# Patient Record
Sex: FEMALE | Race: WHITE | NOT HISPANIC OR LATINO | Employment: PART TIME | URBAN - METROPOLITAN AREA
[De-identification: names, ages, dates, MRNs, and addresses within clinical notes are randomized per-mention and may not be internally consistent; named-entity substitution may affect disease eponyms.]

---

## 2024-07-08 ENCOUNTER — HOSPITAL ENCOUNTER (EMERGENCY)
Facility: HOSPITAL | Age: 29
Discharge: HOME | End: 2024-07-08
Attending: STUDENT IN AN ORGANIZED HEALTH CARE EDUCATION/TRAINING PROGRAM
Payer: MEDICAID

## 2024-07-08 ENCOUNTER — APPOINTMENT (OUTPATIENT)
Dept: CARDIOLOGY | Facility: HOSPITAL | Age: 29
End: 2024-07-08
Payer: MEDICAID

## 2024-07-08 ENCOUNTER — APPOINTMENT (OUTPATIENT)
Dept: RADIOLOGY | Facility: HOSPITAL | Age: 29
End: 2024-07-08
Payer: MEDICAID

## 2024-07-08 VITALS
HEIGHT: 67 IN | DIASTOLIC BLOOD PRESSURE: 78 MMHG | HEART RATE: 70 BPM | OXYGEN SATURATION: 100 % | WEIGHT: 208 LBS | TEMPERATURE: 97.2 F | BODY MASS INDEX: 32.65 KG/M2 | RESPIRATION RATE: 18 BRPM | SYSTOLIC BLOOD PRESSURE: 126 MMHG

## 2024-07-08 DIAGNOSIS — F41.9 ANXIETY: ICD-10-CM

## 2024-07-08 DIAGNOSIS — K21.9 GASTROESOPHAGEAL REFLUX DISEASE, UNSPECIFIED WHETHER ESOPHAGITIS PRESENT: Primary | ICD-10-CM

## 2024-07-08 PROCEDURE — 71046 X-RAY EXAM CHEST 2 VIEWS: CPT | Mod: FOREIGN READ | Performed by: RADIOLOGY

## 2024-07-08 PROCEDURE — 71046 X-RAY EXAM CHEST 2 VIEWS: CPT

## 2024-07-08 PROCEDURE — 99283 EMERGENCY DEPT VISIT LOW MDM: CPT | Mod: 25 | Performed by: STUDENT IN AN ORGANIZED HEALTH CARE EDUCATION/TRAINING PROGRAM

## 2024-07-08 PROCEDURE — 93005 ELECTROCARDIOGRAM TRACING: CPT

## 2024-07-08 PROCEDURE — 2500000001 HC RX 250 WO HCPCS SELF ADMINISTERED DRUGS (ALT 637 FOR MEDICARE OP): Performed by: STUDENT IN AN ORGANIZED HEALTH CARE EDUCATION/TRAINING PROGRAM

## 2024-07-08 RX ORDER — HYDROXYZINE HYDROCHLORIDE 25 MG/1
25 TABLET, FILM COATED ORAL EVERY 6 HOURS
Qty: 12 TABLET | Refills: 0 | Status: SHIPPED | OUTPATIENT
Start: 2024-07-08 | End: 2024-07-11

## 2024-07-08 RX ORDER — FAMOTIDINE 20 MG/1
20 TABLET, FILM COATED ORAL ONCE
Status: COMPLETED | OUTPATIENT
Start: 2024-07-08 | End: 2024-07-08

## 2024-07-08 RX ORDER — FAMOTIDINE 20 MG/1
20 TABLET, FILM COATED ORAL DAILY
Qty: 30 TABLET | Refills: 0 | Status: SHIPPED | OUTPATIENT
Start: 2024-07-08 | End: 2024-08-07

## 2024-07-08 ASSESSMENT — PAIN - FUNCTIONAL ASSESSMENT: PAIN_FUNCTIONAL_ASSESSMENT: 0-10

## 2024-07-08 ASSESSMENT — LIFESTYLE VARIABLES
TOTAL SCORE: 0
HAVE PEOPLE ANNOYED YOU BY CRITICIZING YOUR DRINKING: NO
EVER HAD A DRINK FIRST THING IN THE MORNING TO STEADY YOUR NERVES TO GET RID OF A HANGOVER: NO
HAVE YOU EVER FELT YOU SHOULD CUT DOWN ON YOUR DRINKING: NO
EVER FELT BAD OR GUILTY ABOUT YOUR DRINKING: NO

## 2024-07-08 ASSESSMENT — PAIN SCALES - GENERAL: PAINLEVEL_OUTOF10: 0 - NO PAIN

## 2024-07-08 ASSESSMENT — COLUMBIA-SUICIDE SEVERITY RATING SCALE - C-SSRS
1. IN THE PAST MONTH, HAVE YOU WISHED YOU WERE DEAD OR WISHED YOU COULD GO TO SLEEP AND NOT WAKE UP?: NO
6. HAVE YOU EVER DONE ANYTHING, STARTED TO DO ANYTHING, OR PREPARED TO DO ANYTHING TO END YOUR LIFE?: NO
2. HAVE YOU ACTUALLY HAD ANY THOUGHTS OF KILLING YOURSELF?: NO

## 2024-07-08 NOTE — ED PROVIDER NOTES
"EMERGENCY MEDICINE EVALUATION NOTE    History of Present Illness     Chief Complaint:   Chief Complaint   Patient presents with    Dysphagia     Pt presents to er for complaints of lump sensation in throat that causes patient to have the sensation of difficulty breathing. Pt states able to swallow and breath okay. Pt states tingling sensation going up both sides of her head.       HPI: Tamika Stuart is a 28 y.o. female who presents with complaint of shortness of breath and trouble swallowing.  Patient states for the past several weeks she has been having intermittent episodes of the \"lump sensation\" in her throat I discussed the breathing difficulty.  She states this can occur sporadically while she is at work sitting down without any provoking factors.  She states she does feel anxious at that time although has been having also an occasional burning sensation in her throat.  She has been history of acid reflux in the past with somewhat similar symptoms.  She also admits occasional tingling sensation on both sides of her head.  Denies any current symptoms.  Denies any fever, chills, abdominal pain, nausea or vomiting.  Denies current pregnancy.  She has also had some occasional left-sided chest wall pain.    Previous History     Past Medical History:   Diagnosis Date    Other specified health status     Patient denies medical problems     No past surgical history on file.     No family history on file.  Not on File  Current Outpatient Medications   Medication Instructions    famotidine (PEPCID) 20 mg, oral, Daily    hydrOXYzine HCL (ATARAX) 25 mg, oral, Every 6 hours       Physical Exam     Appearance: Alert, oriented , cooperative,  in acute distress. Well nourished & well hydrated.     Skin: Intact,  dry skin, no lesions, rash, petechiae or purpura.      Eyes: PERRLA, EOMs intact,  Conjunctiva pink with no redness or exudates. Cornea & anterior chamber are clear, Eyelids without lesions. No scleral icterus.    " "  ENT: Hearing grossly intact. External auditory canals patent, tympanic membranes intact with visible landmarks. Nares patent, mucus membranes moist. Dentition without lesions. Pharynx clear, uvula midline.      Neck: Supple, without meningismus. Thyroid not palpable. Trachea at midline. No lymphadenopathy.     Pulmonary: Clear bilaterally with good chest wall excursion. No rales, rhonchi or wheezing. No accessory muscle use or stridor.     Cardiac: Normal S1, S2 without murmur, rub, gallop or extrasystole. No JVD, Carotids without bruits.     Abdomen: Soft, nontender, active bowel sounds.  No palpable organomegaly.  No rebound or guarding.  No CVA tenderness.     Genitourinary: Exam deferred.     Musculoskeletal: Full range of motion. no pain, edema, or deformity. Pulses full and equal. No cyanosis or clubbing.      Neurological:  Cranial nerves II through XII are grossly intact, finger-nose touch is normal, normal sensation, no weakness, no focal findings identified.     Psychiatric: Anxious mood and elevated affect.     Results   Labs Reviewed - No data to display  XR chest 2 views   Final Result   No acute infiltrate or effusion.   Signed by Terrance Seals MD            ED Course & Medical Decision Making     Medications   famotidine (Pepcid) tablet 20 mg (20 mg oral Given 7/8/24 0849)     Diagnoses as of 07/08/24 0927   Gastroesophageal reflux disease, unspecified whether esophagitis present   Anxiety     Heart Rate:  [70]   Temperature:  [36.2 °C (97.2 °F)]   Respirations:  [18]   BP: (126)/(78)   Height:  [170.2 cm (5' 7\")]   Weight:  [94.3 kg (208 lb)]   Pulse Ox:  [100 %]      Tamika Lynch is a 28 y.o. female who presents with complaint of shortness of breath and trouble swallowing.  Patient was currently asymptomatic initial examination with intermittent symptoms.  Vital signs stable and saturating 100% on room air.  Differential includes was limited to acute panic disorder, anxiety, gastroesophageal " reflux disease, ACS.  Low concern for malignancy.  Likely this is related to GERD or anxiety.  EKG was completed which was otherwise nonacute with a normal sinus rhythm and rate of 60 bpm no other signs of ischemic change.  Chest x-ray was also nonacute.  Patient was given Pepcid and did have some relief.  Stable for outpatient management.  She was given a gastroenterology referral as well as daily Pepcid for home use and hydroxyzine as needed for recurrent symptoms.    Procedures   Procedures    Diagnosis     1. Gastroesophageal reflux disease, unspecified whether esophagitis present    2. Anxiety        Disposition     DISCHARGE.  The patient is discharged back to their place of residence.  Discharge diagnosis, instructions and plan were discussed and understood. At the time of discharge the patient was comfortable and was in no apparent distress. Patient is aware of diagnostic uncertainty and was notified though testing is negative here, there is a very small chance that pathology may be missed.  The patient understands these risks and the patient /family understood to return immediately to the emergency department if the symptoms worsen or if they have any additional concerns.    FOLLOW UP  Primary care provider in 1-2 days.        ED Prescriptions       Medication Sig Dispense Start Date End Date Auth. Provider    famotidine (Pepcid) 20 mg tablet Take 1 tablet (20 mg) by mouth once daily. 30 tablet 7/8/2024 8/7/2024 Sunil Cuevas DO    hydrOXYzine HCL (Atarax) 25 mg tablet Take 1 tablet (25 mg) by mouth every 6 hours for 3 days. 12 tablet 7/8/2024 7/11/2024 DO Sunil Vela DO  07/08/24 0928

## 2024-07-08 NOTE — DISCHARGE INSTRUCTIONS
You have been seen at a [facility].  Please follow-up with your primary care provider in the next 1 to 2 days for further evaluation and routine follow-up.  Please return to the emergency room if having any worsening symptoms.  Please follow-up with any specialists if discussed during your emergency room stay.

## 2024-07-13 LAB
ATRIAL RATE: 60 BPM
P AXIS: 14 DEGREES
P OFFSET: 190 MS
P ONSET: 155 MS
PR INTERVAL: 126 MS
Q ONSET: 218 MS
QRS COUNT: 9 BEATS
QRS DURATION: 90 MS
QT INTERVAL: 456 MS
QTC CALCULATION(BAZETT): 456 MS
QTC FREDERICIA: 456 MS
R AXIS: 61 DEGREES
T AXIS: 4 DEGREES
T OFFSET: 446 MS
VENTRICULAR RATE: 60 BPM

## 2024-08-14 ENCOUNTER — HOSPITAL ENCOUNTER (OUTPATIENT)
Dept: CARDIOLOGY | Facility: HOSPITAL | Age: 29
Discharge: HOME | End: 2024-08-14
Payer: MEDICAID

## 2024-08-14 ENCOUNTER — APPOINTMENT (OUTPATIENT)
Dept: RADIOLOGY | Facility: HOSPITAL | Age: 29
End: 2024-08-14
Payer: MEDICAID

## 2024-08-14 ENCOUNTER — HOSPITAL ENCOUNTER (EMERGENCY)
Facility: HOSPITAL | Age: 29
Discharge: HOME | End: 2024-08-14
Attending: EMERGENCY MEDICINE
Payer: MEDICAID

## 2024-08-14 VITALS
DIASTOLIC BLOOD PRESSURE: 57 MMHG | BODY MASS INDEX: 32.33 KG/M2 | TEMPERATURE: 97.2 F | RESPIRATION RATE: 18 BRPM | OXYGEN SATURATION: 100 % | HEART RATE: 65 BPM | HEIGHT: 67 IN | SYSTOLIC BLOOD PRESSURE: 100 MMHG | WEIGHT: 206 LBS

## 2024-08-14 DIAGNOSIS — R07.9 CHEST PAIN, UNSPECIFIED: ICD-10-CM

## 2024-08-14 DIAGNOSIS — R07.9 CHEST PAIN, UNSPECIFIED TYPE: Primary | ICD-10-CM

## 2024-08-14 PROCEDURE — 99283 EMERGENCY DEPT VISIT LOW MDM: CPT

## 2024-08-14 PROCEDURE — 71046 X-RAY EXAM CHEST 2 VIEWS: CPT

## 2024-08-14 PROCEDURE — 71046 X-RAY EXAM CHEST 2 VIEWS: CPT | Performed by: RADIOLOGY

## 2024-08-14 PROCEDURE — 93005 ELECTROCARDIOGRAM TRACING: CPT

## 2024-08-14 ASSESSMENT — COLUMBIA-SUICIDE SEVERITY RATING SCALE - C-SSRS
6. HAVE YOU EVER DONE ANYTHING, STARTED TO DO ANYTHING, OR PREPARED TO DO ANYTHING TO END YOUR LIFE?: NO
2. HAVE YOU ACTUALLY HAD ANY THOUGHTS OF KILLING YOURSELF?: NO
1. IN THE PAST MONTH, HAVE YOU WISHED YOU WERE DEAD OR WISHED YOU COULD GO TO SLEEP AND NOT WAKE UP?: NO

## 2024-08-14 ASSESSMENT — PAIN DESCRIPTION - LOCATION: LOCATION: CHEST

## 2024-08-14 ASSESSMENT — PAIN DESCRIPTION - ORIENTATION: ORIENTATION: MID

## 2024-08-14 ASSESSMENT — PAIN DESCRIPTION - DESCRIPTORS: DESCRIPTORS: ACHING

## 2024-08-14 ASSESSMENT — LIFESTYLE VARIABLES
EVER HAD A DRINK FIRST THING IN THE MORNING TO STEADY YOUR NERVES TO GET RID OF A HANGOVER: NO
HAVE PEOPLE ANNOYED YOU BY CRITICIZING YOUR DRINKING: NO
EVER FELT BAD OR GUILTY ABOUT YOUR DRINKING: NO
TOTAL SCORE: 0
HAVE YOU EVER FELT YOU SHOULD CUT DOWN ON YOUR DRINKING: NO

## 2024-08-14 ASSESSMENT — PAIN - FUNCTIONAL ASSESSMENT: PAIN_FUNCTIONAL_ASSESSMENT: 0-10

## 2024-08-14 ASSESSMENT — PAIN SCALES - GENERAL: PAINLEVEL_OUTOF10: 5 - MODERATE PAIN

## 2024-08-14 ASSESSMENT — PAIN DESCRIPTION - PAIN TYPE: TYPE: ACUTE PAIN

## 2024-08-14 NOTE — ED PROVIDER NOTES
HPI   Chief Complaint   Patient presents with    Chest Pain     Patient comes in with reported intermittent chest pain x2-3days. 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 She also reports non specific abd discomfort x2 weeks, denies any N/V/D .States bowels are moving normally.        Presents for chest pain.  Past 4 days.  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  Radiates down her left arm as well.  She has no past medical history.  She denies any fever, chills or night sweats.  No nausea vomiting or diaphoresis.  Is not pleuritic.  No history of DVT.  No recent travel.              Patient History   Past Medical History:   Diagnosis Date    Other specified health status     Patient denies medical problems     No past surgical history on file.  No family history on file.  Social History     Tobacco Use    Smoking status: Not on file    Smokeless tobacco: Not on file   Substance Use Topics    Alcohol use: Not on file    Drug use: Not on file       Physical Exam   ED Triage Vitals [08/14/24 0021]   Temperature Heart Rate Respirations BP   36.2 °C (97.2 °F) 66 19 117/71      Pulse Ox Temp Source Heart Rate Source Patient Position   99 % Temporal Monitor Sitting      BP Location FiO2 (%)     Left arm --       Physical Exam  Vitals and nursing note reviewed.   Constitutional:       General: She is not in acute distress.     Appearance: She is well-developed.   HENT:      Head: Normocephalic and atraumatic.   Eyes:      Conjunctiva/sclera: Conjunctivae normal.   Cardiovascular:      Rate and Rhythm: Normal rate and regular rhythm.      Heart sounds: No murmur heard.  Pulmonary:      Effort: Pulmonary effort is normal. No respiratory distress.      Breath sounds: Normal breath sounds.   Abdominal:      Palpations: Abdomen is soft.      Tenderness: There is no abdominal tenderness.   Musculoskeletal:         General: No swelling.      Cervical back: Neck supple.   Skin:     General:  Skin is warm and dry.      Capillary Refill: Capillary refill takes less than 2 seconds.   Neurological:      Mental Status: She is alert.   Psychiatric:         Mood and Affect: Mood normal.           ED Course & MDM   Diagnoses as of 08/14/24 0403   Chest pain, unspecified type                 No data recorded     [place] Coma Scale Score: 15 (08/14/24 0022 : Mahsa Mueller RN)                           Medical Decision Making  Twelve-lead EKG is normal sinus rhythm with nonspecific ST-T wave changes.  There is no ischemia or injury pattern.  Is interpreted by emergency physician.    2 view chest x-ray shows no explanation for pain.  She has some perihilar fullness on the right side is not consistent with her chest pain and radiating down her left arm.  I do not believe this is cardiac.  She does note strong family history of cardiac.  She does not smoke.  He otherwise healthy 29-year-old.  Heart score equals 0.  Without the troponin.  Patient will be discharged.  Prior medical records were reviewed.        Procedure  Procedures     Jose Wright MD  08/14/24 0408

## 2024-08-14 NOTE — ED TRIAGE NOTES
Patient comes in with reported intermittent chest pain x2-3days. 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 She also reports non specific abd discomfort x2 weeks, denies any N/V/D .States bowels are moving normally.

## 2024-08-19 PROCEDURE — 93005 ELECTROCARDIOGRAM TRACING: CPT

## 2024-08-20 LAB
ATRIAL RATE: 68 BPM
P AXIS: 27 DEGREES
PR INTERVAL: 146 MS
Q ONSET: 252 MS
QRS COUNT: 11 BEATS
QRS DURATION: 102 MS
QT INTERVAL: 428 MS
QTC CALCULATION(BAZETT): 459 MS
QTC FREDERICIA: 448 MS
R AXIS: 1 DEGREES
T AXIS: -30 DEGREES
T OFFSET: 466 MS
VENTRICULAR RATE: 69 BPM

## 2024-09-06 ENCOUNTER — APPOINTMENT (OUTPATIENT)
Dept: GASTROENTEROLOGY | Facility: CLINIC | Age: 29
End: 2024-09-06
Payer: MEDICAID

## 2024-10-09 ENCOUNTER — APPOINTMENT (OUTPATIENT)
Dept: RADIOLOGY | Facility: HOSPITAL | Age: 29
End: 2024-10-09
Payer: MEDICAID

## 2024-10-09 ENCOUNTER — APPOINTMENT (OUTPATIENT)
Dept: CARDIOLOGY | Facility: HOSPITAL | Age: 29
End: 2024-10-09
Payer: MEDICAID

## 2024-10-09 ENCOUNTER — HOSPITAL ENCOUNTER (EMERGENCY)
Facility: HOSPITAL | Age: 29
Discharge: HOME | End: 2024-10-10
Attending: EMERGENCY MEDICINE
Payer: MEDICAID

## 2024-10-09 VITALS
BODY MASS INDEX: 30.13 KG/M2 | HEIGHT: 67 IN | OXYGEN SATURATION: 100 % | DIASTOLIC BLOOD PRESSURE: 60 MMHG | SYSTOLIC BLOOD PRESSURE: 107 MMHG | RESPIRATION RATE: 16 BRPM | WEIGHT: 192 LBS | TEMPERATURE: 98.4 F | HEART RATE: 65 BPM

## 2024-10-09 DIAGNOSIS — N93.9 VAGINAL SPOTTING: ICD-10-CM

## 2024-10-09 DIAGNOSIS — R07.9 CHEST PAIN, UNSPECIFIED TYPE: Primary | ICD-10-CM

## 2024-10-09 LAB
ABO GROUP (TYPE) IN BLOOD: NORMAL
ALBUMIN SERPL BCP-MCNC: 4 G/DL (ref 3.4–5)
ALP SERPL-CCNC: 46 U/L (ref 33–110)
ALT SERPL W P-5'-P-CCNC: 7 U/L (ref 7–45)
ANION GAP SERPL CALC-SCNC: 10 MMOL/L (ref 10–20)
ANTIBODY SCREEN: NORMAL
APPEARANCE UR: ABNORMAL
APTT PPP: 31 SECONDS (ref 27–38)
AST SERPL W P-5'-P-CCNC: 10 U/L (ref 9–39)
BASOPHILS # BLD AUTO: 0.03 X10*3/UL (ref 0–0.1)
BASOPHILS NFR BLD AUTO: 0.4 %
BILIRUB DIRECT SERPL-MCNC: 0 MG/DL (ref 0–0.3)
BILIRUB SERPL-MCNC: 0.2 MG/DL (ref 0–1.2)
BILIRUB UR STRIP.AUTO-MCNC: NEGATIVE MG/DL
BUN SERPL-MCNC: 11 MG/DL (ref 6–23)
CALCIUM SERPL-MCNC: 9.3 MG/DL (ref 8.6–10.3)
CARDIAC TROPONIN I PNL SERPL HS: <3 NG/L (ref 0–13)
CHLORIDE SERPL-SCNC: 105 MMOL/L (ref 98–107)
CO2 SERPL-SCNC: 25 MMOL/L (ref 21–32)
COLOR UR: ABNORMAL
CREAT SERPL-MCNC: 0.69 MG/DL (ref 0.5–1.05)
EGFRCR SERPLBLD CKD-EPI 2021: >90 ML/MIN/1.73M*2
EOSINOPHIL # BLD AUTO: 0.07 X10*3/UL (ref 0–0.7)
EOSINOPHIL NFR BLD AUTO: 0.9 %
ERYTHROCYTE [DISTWIDTH] IN BLOOD BY AUTOMATED COUNT: 13.2 % (ref 11.5–14.5)
GLUCOSE SERPL-MCNC: 84 MG/DL (ref 74–99)
GLUCOSE UR STRIP.AUTO-MCNC: NORMAL MG/DL
HCT VFR BLD AUTO: 39 % (ref 36–46)
HGB BLD-MCNC: 13 G/DL (ref 12–16)
IMM GRANULOCYTES # BLD AUTO: 0.02 X10*3/UL (ref 0–0.7)
IMM GRANULOCYTES NFR BLD AUTO: 0.2 % (ref 0–0.9)
INR PPP: 1 (ref 0.9–1.1)
KETONES UR STRIP.AUTO-MCNC: NEGATIVE MG/DL
LEUKOCYTE ESTERASE UR QL STRIP.AUTO: NEGATIVE
LIPASE SERPL-CCNC: 50 U/L (ref 9–82)
LYMPHOCYTES # BLD AUTO: 1.78 X10*3/UL (ref 1.2–4.8)
LYMPHOCYTES NFR BLD AUTO: 22.1 %
MAGNESIUM SERPL-MCNC: 1.79 MG/DL (ref 1.6–2.4)
MCH RBC QN AUTO: 30.4 PG (ref 26–34)
MCHC RBC AUTO-ENTMCNC: 33.3 G/DL (ref 32–36)
MCV RBC AUTO: 91 FL (ref 80–100)
MONOCYTES # BLD AUTO: 0.56 X10*3/UL (ref 0.1–1)
MONOCYTES NFR BLD AUTO: 7 %
NEUTROPHILS # BLD AUTO: 5.59 X10*3/UL (ref 1.2–7.7)
NEUTROPHILS NFR BLD AUTO: 69.4 %
NITRITE UR QL STRIP.AUTO: NEGATIVE
NRBC BLD-RTO: 0 /100 WBCS (ref 0–0)
PH UR STRIP.AUTO: 6.5 [PH]
PLATELET # BLD AUTO: 191 X10*3/UL (ref 150–450)
POTASSIUM SERPL-SCNC: 3.3 MMOL/L (ref 3.5–5.3)
PROT SERPL-MCNC: 6.7 G/DL (ref 6.4–8.2)
PROT UR STRIP.AUTO-MCNC: NEGATIVE MG/DL
PROTHROMBIN TIME: 11.3 SECONDS (ref 9.8–12.8)
RBC # BLD AUTO: 4.27 X10*6/UL (ref 4–5.2)
RBC # UR STRIP.AUTO: NEGATIVE /UL
RH FACTOR (ANTIGEN D): NORMAL
SODIUM SERPL-SCNC: 137 MMOL/L (ref 136–145)
SP GR UR STRIP.AUTO: 1.02
UROBILINOGEN UR STRIP.AUTO-MCNC: NORMAL MG/DL
WBC # BLD AUTO: 8.1 X10*3/UL (ref 4.4–11.3)

## 2024-10-09 PROCEDURE — 86901 BLOOD TYPING SEROLOGIC RH(D): CPT | Performed by: EMERGENCY MEDICINE

## 2024-10-09 PROCEDURE — 84484 ASSAY OF TROPONIN QUANT: CPT | Performed by: EMERGENCY MEDICINE

## 2024-10-09 PROCEDURE — 85610 PROTHROMBIN TIME: CPT | Performed by: EMERGENCY MEDICINE

## 2024-10-09 PROCEDURE — 85025 COMPLETE CBC W/AUTO DIFF WBC: CPT | Performed by: EMERGENCY MEDICINE

## 2024-10-09 PROCEDURE — 93005 ELECTROCARDIOGRAM TRACING: CPT

## 2024-10-09 PROCEDURE — 99285 EMERGENCY DEPT VISIT HI MDM: CPT | Mod: 25

## 2024-10-09 PROCEDURE — 83735 ASSAY OF MAGNESIUM: CPT | Performed by: EMERGENCY MEDICINE

## 2024-10-09 PROCEDURE — 76801 OB US < 14 WKS SINGLE FETUS: CPT | Performed by: RADIOLOGY

## 2024-10-09 PROCEDURE — 80048 BASIC METABOLIC PNL TOTAL CA: CPT | Performed by: EMERGENCY MEDICINE

## 2024-10-09 PROCEDURE — 76801 OB US < 14 WKS SINGLE FETUS: CPT

## 2024-10-09 PROCEDURE — 36415 COLL VENOUS BLD VENIPUNCTURE: CPT | Performed by: EMERGENCY MEDICINE

## 2024-10-09 PROCEDURE — 71045 X-RAY EXAM CHEST 1 VIEW: CPT | Performed by: RADIOLOGY

## 2024-10-09 PROCEDURE — 2500000004 HC RX 250 GENERAL PHARMACY W/ HCPCS (ALT 636 FOR OP/ED): Performed by: EMERGENCY MEDICINE

## 2024-10-09 PROCEDURE — 96374 THER/PROPH/DIAG INJ IV PUSH: CPT

## 2024-10-09 PROCEDURE — 71045 X-RAY EXAM CHEST 1 VIEW: CPT

## 2024-10-09 PROCEDURE — 81003 URINALYSIS AUTO W/O SCOPE: CPT | Performed by: EMERGENCY MEDICINE

## 2024-10-09 PROCEDURE — 76817 TRANSVAGINAL US OBSTETRIC: CPT | Performed by: RADIOLOGY

## 2024-10-09 PROCEDURE — 2500000001 HC RX 250 WO HCPCS SELF ADMINISTERED DRUGS (ALT 637 FOR MEDICARE OP): Performed by: EMERGENCY MEDICINE

## 2024-10-09 PROCEDURE — 83690 ASSAY OF LIPASE: CPT | Performed by: EMERGENCY MEDICINE

## 2024-10-09 RX ORDER — ONDANSETRON HYDROCHLORIDE 2 MG/ML
4 INJECTION, SOLUTION INTRAVENOUS ONCE
Status: COMPLETED | OUTPATIENT
Start: 2024-10-09 | End: 2024-10-09

## 2024-10-09 RX ORDER — ACETAMINOPHEN 325 MG/1
650 TABLET ORAL ONCE
Status: COMPLETED | OUTPATIENT
Start: 2024-10-09 | End: 2024-10-09

## 2024-10-09 RX ADMIN — ACETAMINOPHEN 650 MG: 325 TABLET ORAL at 23:12

## 2024-10-09 RX ADMIN — ONDANSETRON 4 MG: 2 INJECTION INTRAMUSCULAR; INTRAVENOUS at 23:12

## 2024-10-09 ASSESSMENT — PAIN SCALES - GENERAL
PAINLEVEL_OUTOF10: 3
PAINLEVEL_OUTOF10: 4

## 2024-10-09 ASSESSMENT — PAIN DESCRIPTION - FREQUENCY: FREQUENCY: INTERMITTENT

## 2024-10-09 ASSESSMENT — LIFESTYLE VARIABLES
EVER FELT BAD OR GUILTY ABOUT YOUR DRINKING: NO
EVER HAD A DRINK FIRST THING IN THE MORNING TO STEADY YOUR NERVES TO GET RID OF A HANGOVER: NO
HAVE PEOPLE ANNOYED YOU BY CRITICIZING YOUR DRINKING: NO
HAVE YOU EVER FELT YOU SHOULD CUT DOWN ON YOUR DRINKING: NO
TOTAL SCORE: 0

## 2024-10-09 ASSESSMENT — PAIN DESCRIPTION - PAIN TYPE: TYPE: ACUTE PAIN

## 2024-10-09 ASSESSMENT — PAIN - FUNCTIONAL ASSESSMENT: PAIN_FUNCTIONAL_ASSESSMENT: 0-10

## 2024-10-09 ASSESSMENT — COLUMBIA-SUICIDE SEVERITY RATING SCALE - C-SSRS
6. HAVE YOU EVER DONE ANYTHING, STARTED TO DO ANYTHING, OR PREPARED TO DO ANYTHING TO END YOUR LIFE?: NO
1. IN THE PAST MONTH, HAVE YOU WISHED YOU WERE DEAD OR WISHED YOU COULD GO TO SLEEP AND NOT WAKE UP?: NO
2. HAVE YOU ACTUALLY HAD ANY THOUGHTS OF KILLING YOURSELF?: NO

## 2024-10-09 ASSESSMENT — PAIN DESCRIPTION - DESCRIPTORS
DESCRIPTORS: ACHING
DESCRIPTORS: ACHING

## 2024-10-09 ASSESSMENT — PAIN DESCRIPTION - LOCATION: LOCATION: CHEST

## 2024-10-09 ASSESSMENT — PAIN DESCRIPTION - ORIENTATION: ORIENTATION: LEFT

## 2024-10-10 NOTE — DISCHARGE INSTRUCTIONS
You were seen in the ED for chest pains.  Your testing, including EKG, x-ray, and blood tests were normal.  Your ultrasound looked okay.  Please follow up with your OB doctor or primary doctor.  Return to the ED for any worsening symptoms.

## 2024-10-10 NOTE — ED TRIAGE NOTES
30 y/o female states she is 11.5 weeks pregnant and is complaining of chest pain for 1 week and was seen at [facility] for same complaint. Patient states she is also spotting and having RUQ abdominal cramping.

## 2024-10-10 NOTE — ED PROVIDER NOTES
"External Records Reviewed: outpatient ultrasound which revealed intrauterine pregnancy and small subchorionic hemorrhage    HPI  Tamika Stuart is a 29 y.o.  female at 11 weeks 4 days gestational age with a history of GERD presenting to the ED with chest pain, vaginal bleeding, and abdominal cramping.  The patient reports that she has been having intermittent central chest pains for the last week.  They come and go without any persistent reciprocating factors.  She was seen in a different emergency department had a negative workup at that time.  She denies any shortness of breath.  She reports nausea with intermittent episodes of nonbloody nonbilious emesis.  She denies any lightheadedness, diaphoresis, fevers, cough, sputum production.  She does report that earlier today she developed of small amount of vaginal spotting with suprapubic abdominal cramping.  She denies any dysuria or hematuria.    PMH  Past Medical History:   Diagnosis Date    Other specified health status     Patient denies medical problems       Meds  Current Outpatient Medications   Medication Instructions    famotidine (PEPCID) 20 mg, oral, Daily    hydrOXYzine HCL (ATARAX) 25 mg, oral, Every 6 hours       Allergies  No Known Allergies     SHx       ------------------------------------------------------------------------------------------------------------------------------------------    /60   Pulse 65   Temp 36.9 °C (98.4 °F) (Temporal)   Resp 16   Ht 1.702 m (5' 7\")   Wt 87.1 kg (192 lb)   LMP 2024 (Approximate)   SpO2 100%   BMI 30.07 kg/m²     Physical Exam  Vitals and nursing note reviewed.   Constitutional:       General: She is not in acute distress.     Appearance: Normal appearance.   HENT:      Head: Normocephalic and atraumatic.      Right Ear: External ear normal.      Left Ear: External ear normal.   Eyes:      Extraocular Movements: Extraocular movements intact.      Conjunctiva/sclera: Conjunctivae " normal.   Cardiovascular:      Rate and Rhythm: Normal rate and regular rhythm.      Pulses: Normal pulses.      Heart sounds: Normal heart sounds. No murmur heard.     No friction rub. No gallop.   Pulmonary:      Effort: Pulmonary effort is normal. No respiratory distress.      Breath sounds: No wheezing, rhonchi or rales.   Abdominal:      General: There is no distension.      Palpations: Abdomen is soft.      Tenderness: There is no abdominal tenderness. There is no guarding or rebound.   Genitourinary:     Comments: Patient deferred  examination  Musculoskeletal:         General: No swelling. Normal range of motion.      Cervical back: Neck supple.      Right lower leg: No edema.      Left lower leg: No edema.   Skin:     General: Skin is warm and dry.   Neurological:      General: No focal deficit present.      Mental Status: She is alert and oriented to person, place, and time.   Psychiatric:         Mood and Affect: Mood normal.          ------------------------------------------------------------------------------------------------------------------------------------------    Medical Decision Making: This is a well-appearing 29-year-old female presenting to the emergency department chest pain, vaginal bleeding, and suprapubic abdominal cramping.  Vital signs are normal and stable.  On examination her abdomen is soft and nontender.  At this time, the etiology for her chest pain is not entirely clear.  She is low risk by Wells criteria and PERC negative so have low concern for pulmonary embolism.  Her nondetectable he low troponin and nonischemic EKG rule out acute coronary syndrome.  Chest x-ray rules out pneumothorax and pneumonia.  Her history is inconsistent with esophageal rupture and aortic dissection.  She does have a history of GERD so this may be related to GERD, however her symptoms feel different than her previous episodes of GERD.  Additional considerations include anxiety, musculoskeletal chest  pain.  Ultimately though, the patient was given Tylenol in the ED for pain with mild improvement of her symptoms.  She was encouraged to follow-up with her primary care provider for further workup and evaluation.  Additionally, the patient reported suprapubic abdominal cramping with a small mount of vaginal spotting.  I did offer to perform a  exam, which the patient deferred at this time.  Given this and only unable to determine whether her cervical os is open or closed but I suspect that this likely relates to threatened  given her only very small amount of spotting.  Repeat ultrasound today shows no significant abnormalities.  Hemoglobin is stable.  Rh is positive so does not require RhoGAM at this time.  Given the above findings, she stable for discharge.  She was given return precautions to return to the ED for any worsening symptoms but encouraged primarily to follow-up with her primary care provider and as well as her obstetrician.      Independent Interpretations: CXR shows no intrathoracic abnormalities    EKG interpreted by me:  ED Course as of 10/10/24 0023   Wed Oct 09, 2024   2220 EKG:  Rate 63  NSR  Normal axis  Normal intervals  Nonspecific ST changes [AG]      ED Course User Index  [AG] Devaughn Meade MD         Diagnoses as of 10/10/24 0023   Chest pain, unspecified type   Vaginal spotting           Devaughn Meade MD  Emergency Medicine Attending       Devaughn Meade MD  10/10/24 0029

## 2024-10-13 LAB
ATRIAL RATE: 61 BPM
P AXIS: 14 DEGREES
PR INTERVAL: 123 MS
Q ONSET: 252 MS
QRS COUNT: 10 BEATS
QRS DURATION: 101 MS
QT INTERVAL: 447 MS
QTC CALCULATION(BAZETT): 458 MS
QTC FREDERICIA: 454 MS
R AXIS: -6 DEGREES
T AXIS: -14 DEGREES
T OFFSET: 476 MS
VENTRICULAR RATE: 63 BPM

## 2024-10-23 ENCOUNTER — APPOINTMENT (OUTPATIENT)
Dept: RADIOLOGY | Facility: HOSPITAL | Age: 29
End: 2024-10-23
Payer: MEDICAID

## 2024-10-23 ENCOUNTER — APPOINTMENT (OUTPATIENT)
Dept: CARDIOLOGY | Facility: HOSPITAL | Age: 29
End: 2024-10-23
Payer: MEDICAID

## 2024-10-23 ENCOUNTER — HOSPITAL ENCOUNTER (EMERGENCY)
Facility: HOSPITAL | Age: 29
Discharge: HOME | End: 2024-10-23
Payer: MEDICAID

## 2024-10-23 VITALS
HEART RATE: 62 BPM | RESPIRATION RATE: 16 BRPM | TEMPERATURE: 98.4 F | DIASTOLIC BLOOD PRESSURE: 70 MMHG | SYSTOLIC BLOOD PRESSURE: 112 MMHG | OXYGEN SATURATION: 100 % | HEIGHT: 67 IN | WEIGHT: 192 LBS | BODY MASS INDEX: 30.13 KG/M2

## 2024-10-23 DIAGNOSIS — R07.9 CHEST PAIN, UNSPECIFIED TYPE: Primary | ICD-10-CM

## 2024-10-23 DIAGNOSIS — R00.2 PALPITATION: ICD-10-CM

## 2024-10-23 PROBLEM — N20.0 NEPHROLITHIASIS: Status: ACTIVE | Noted: 2019-12-05

## 2024-10-23 PROBLEM — K21.9 GERD (GASTROESOPHAGEAL REFLUX DISEASE): Status: ACTIVE | Noted: 2024-09-25

## 2024-10-23 PROBLEM — O99.820 GBS (GROUP B STREPTOCOCCUS CARRIER), +RV CULTURE, CURRENTLY PREGNANT (HHS-HCC): Status: ACTIVE | Noted: 2020-03-10

## 2024-10-23 PROBLEM — J01.90 ACUTE RHINOSINUSITIS: Status: ACTIVE | Noted: 2024-10-23

## 2024-10-23 PROBLEM — O42.90: Status: ACTIVE | Noted: 2020-07-11

## 2024-10-23 PROBLEM — R09.81 NASAL CONGESTION: Status: ACTIVE | Noted: 2024-10-23

## 2024-10-23 PROBLEM — Q22.1 CONGENITAL STENOSIS OF PULMONARY VALVE (HHS-HCC): Status: ACTIVE | Noted: 2020-06-25

## 2024-10-23 LAB
ALBUMIN SERPL BCP-MCNC: 4 G/DL (ref 3.4–5)
ALP SERPL-CCNC: 44 U/L (ref 33–110)
ALT SERPL W P-5'-P-CCNC: 7 U/L (ref 7–45)
ANION GAP SERPL CALC-SCNC: 14 MMOL/L (ref 10–20)
AST SERPL W P-5'-P-CCNC: 10 U/L (ref 9–39)
BASOPHILS # BLD AUTO: 0.05 X10*3/UL (ref 0–0.1)
BASOPHILS NFR BLD AUTO: 0.5 %
BILIRUB SERPL-MCNC: 0.3 MG/DL (ref 0–1.2)
BUN SERPL-MCNC: 13 MG/DL (ref 6–23)
CALCIUM SERPL-MCNC: 9.4 MG/DL (ref 8.6–10.3)
CARDIAC TROPONIN I PNL SERPL HS: <3 NG/L (ref 0–13)
CHLORIDE SERPL-SCNC: 102 MMOL/L (ref 98–107)
CO2 SERPL-SCNC: 25 MMOL/L (ref 21–32)
CREAT SERPL-MCNC: 0.59 MG/DL (ref 0.5–1.05)
EGFRCR SERPLBLD CKD-EPI 2021: >90 ML/MIN/1.73M*2
EOSINOPHIL # BLD AUTO: 0.09 X10*3/UL (ref 0–0.7)
EOSINOPHIL NFR BLD AUTO: 0.9 %
ERYTHROCYTE [DISTWIDTH] IN BLOOD BY AUTOMATED COUNT: 12.8 % (ref 11.5–14.5)
GLUCOSE SERPL-MCNC: 82 MG/DL (ref 74–99)
HCT VFR BLD AUTO: 37.8 % (ref 36–46)
HGB BLD-MCNC: 12.7 G/DL (ref 12–16)
IMM GRANULOCYTES # BLD AUTO: 0.03 X10*3/UL (ref 0–0.7)
IMM GRANULOCYTES NFR BLD AUTO: 0.3 % (ref 0–0.9)
LYMPHOCYTES # BLD AUTO: 1.77 X10*3/UL (ref 1.2–4.8)
LYMPHOCYTES NFR BLD AUTO: 18.2 %
MCH RBC QN AUTO: 30 PG (ref 26–34)
MCHC RBC AUTO-ENTMCNC: 33.6 G/DL (ref 32–36)
MCV RBC AUTO: 89 FL (ref 80–100)
MONOCYTES # BLD AUTO: 0.59 X10*3/UL (ref 0.1–1)
MONOCYTES NFR BLD AUTO: 6.1 %
NEUTROPHILS # BLD AUTO: 7.19 X10*3/UL (ref 1.2–7.7)
NEUTROPHILS NFR BLD AUTO: 74 %
NRBC BLD-RTO: 0 /100 WBCS (ref 0–0)
PLATELET # BLD AUTO: 212 X10*3/UL (ref 150–450)
POTASSIUM SERPL-SCNC: 3.9 MMOL/L (ref 3.5–5.3)
PROT SERPL-MCNC: 6.8 G/DL (ref 6.4–8.2)
RBC # BLD AUTO: 4.23 X10*6/UL (ref 4–5.2)
SODIUM SERPL-SCNC: 137 MMOL/L (ref 136–145)
WBC # BLD AUTO: 9.7 X10*3/UL (ref 4.4–11.3)

## 2024-10-23 PROCEDURE — 84484 ASSAY OF TROPONIN QUANT: CPT | Performed by: NURSE PRACTITIONER

## 2024-10-23 PROCEDURE — 36415 COLL VENOUS BLD VENIPUNCTURE: CPT | Performed by: NURSE PRACTITIONER

## 2024-10-23 PROCEDURE — 99283 EMERGENCY DEPT VISIT LOW MDM: CPT

## 2024-10-23 PROCEDURE — 71046 X-RAY EXAM CHEST 2 VIEWS: CPT

## 2024-10-23 PROCEDURE — 80053 COMPREHEN METABOLIC PANEL: CPT | Performed by: NURSE PRACTITIONER

## 2024-10-23 PROCEDURE — 71046 X-RAY EXAM CHEST 2 VIEWS: CPT | Mod: FOREIGN READ | Performed by: RADIOLOGY

## 2024-10-23 PROCEDURE — 85025 COMPLETE CBC W/AUTO DIFF WBC: CPT | Performed by: NURSE PRACTITIONER

## 2024-10-23 PROCEDURE — 93005 ELECTROCARDIOGRAM TRACING: CPT

## 2024-10-23 ASSESSMENT — PAIN DESCRIPTION - DESCRIPTORS
DESCRIPTORS: SHOOTING;SHARP
DESCRIPTORS: SHOOTING;SHARP

## 2024-10-23 ASSESSMENT — HEART SCORE
HISTORY: SLIGHTLY SUSPICIOUS
AGE: <45
RISK FACTORS: 1-2 RISK FACTORS
HEART SCORE: 2
ECG: NON-SPECIFIC REPOLARIZATION DISTURBANCE
TROPONIN: LESS THAN OR EQUAL TO NORMAL LIMIT

## 2024-10-23 ASSESSMENT — PAIN DESCRIPTION - ORIENTATION: ORIENTATION: RIGHT

## 2024-10-23 ASSESSMENT — PAIN - FUNCTIONAL ASSESSMENT: PAIN_FUNCTIONAL_ASSESSMENT: 0-10

## 2024-10-23 ASSESSMENT — PAIN DESCRIPTION - LOCATION: LOCATION: CHEST

## 2024-10-23 ASSESSMENT — PAIN DESCRIPTION - PAIN TYPE: TYPE: ACUTE PAIN

## 2024-10-23 ASSESSMENT — PAIN SCALES - GENERAL: PAINLEVEL_OUTOF10: 7

## 2024-10-23 NOTE — ED PROVIDER NOTES
HPI   Chief Complaint   Patient presents with    Chest Pain     Pt states that she has been having pain in her right chest area that goes into her neck since 3PM. States that she regularly gets palpitations and she does have GERD as well. States that she is 13 weeks pregnant at this time.        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  Patient symptoms have been ongoing for several weeks however became worse earlier today.  Patient states palpitations cause a intermittent tightness to the right side of the chest that last for 2 to 3 seconds and resolve on their own spontaneously.  Patient denies any alleviating or worsening factors.  Patient denies any lightheadedness, dizziness, syncopal or syncopal events.  Patient states he is also 13 weeks pregnant.  Patient denies any abdominal pain with vaginal discharge or vaginal bleeding.  Patient denies any urinary symptoms, shortness of breath or difficulty breathing, fever, shaking, or chills.              Patient History   Past Medical History:   Diagnosis Date    Other specified health status     Patient denies medical problems     History reviewed. No pertinent surgical history.  No family history on file.  Social History     Tobacco Use    Smoking status: Not on file    Smokeless tobacco: Not on file   Substance Use Topics    Alcohol use: Not on file    Drug use: Not on file       Physical Exam   ED Triage Vitals [10/23/24 1733]   Temperature Heart Rate Respirations BP   36.9 °C (98.4 °F) 62 16 112/70      Pulse Ox Temp Source Heart Rate Source Patient Position   100 % Tympanic Monitor --      BP Location FiO2 (%)     Right arm --       Physical Exam  Vitals and nursing note  reviewed. Exam conducted with a chaperone present.   Constitutional:       General: She is not in acute distress.     Appearance: Normal appearance. She is not ill-appearing, toxic-appearing or diaphoretic.      Interventions: She is not intubated.  HENT:      Head: Normocephalic.      Nose: Nose normal.      Mouth/Throat:      Mouth: Mucous membranes are moist.      Pharynx: No oropharyngeal exudate or posterior oropharyngeal erythema.   Eyes:      General:         Right eye: No discharge.         Left eye: No discharge.      Extraocular Movements: Extraocular movements intact.      Pupils: Pupils are equal, round, and reactive to light.   Neck:      Thyroid: No thyromegaly.      Vascular: No carotid bruit, hepatojugular reflux or JVD.      Trachea: No tracheal deviation.   Cardiovascular:      Rate and Rhythm: Normal rate and regular rhythm.      Pulses: Normal pulses. No decreased pulses.      Heart sounds: Normal heart sounds. Heart sounds not distant. No murmur heard.     No friction rub. No gallop.   Pulmonary:      Effort: Pulmonary effort is normal. No tachypnea, bradypnea, accessory muscle usage, prolonged expiration, respiratory distress or retractions. She is not intubated.      Breath sounds: Normal breath sounds. No stridor, decreased air movement or transmitted upper airway sounds. No decreased breath sounds, wheezing, rhonchi or rales.   Chest:      Chest wall: No tenderness.   Abdominal:      General: Abdomen is flat. Bowel sounds are normal.      Palpations: Abdomen is soft.   Musculoskeletal:         General: Normal range of motion.      Cervical back: Normal range of motion and neck supple. No rigidity or tenderness.   Lymphadenopathy:      Cervical: No cervical adenopathy.   Skin:     General: Skin is warm and dry.      Capillary Refill: Capillary refill takes less than 2 seconds.   Neurological:      General: No focal deficit present.      Mental Status: She is alert and oriented to person,  place, and time.           ED Course & MDM   ED Course as of 10/23/24 2027   Wed Oct 23, 2024   1943 Chest x-ray reveals  IMPRESSION:  Normal heart size with no signs of active pulmonary parenchymal  infiltration.  Findings similar to the prior.   [EC]   2025 EKG interpreted by myself reveals sinus rhythm and rate of 74 bpm with no ST elevation however there is T wave inversion in lead III, aVF, V1, V3, V4, V5 and is similar comparison to previous EKG, IA interval 138, QRS 96 and QTc of 467. [EC]      ED Course User Index  [EC] BYRON Pina         Diagnoses as of 10/23/24 2027   Chest pain, unspecified type   Palpitation                 No data recorded     [place] Coma Scale Score: 15 (10/23/24 1737 : Abrahan Grant RN) HEART Score: 2 (10/23/24 2026 : BYRON Pina)                         Medical Decision Making  Given patient's coming presentation a thorough exam was performed.  Patient remains hemodynamically stable during emergency evaluation and is afebrile, no adventitious lung sounds auscultated, speaking complete sentences no respiratory distress, cardiac sounds auscultated are regular, no reproducible abdominal tenderness upon palpation, bowel sounds present in all 4 quadrants, independently ambulatory with any difficulty, I have a low suspicion for ACS, pulmonary embolism, aortic aneurysm, cardiac arrhythmia.  Lab work was obtained including EKG and chest x-ray. Lab work is grossly unremarkable, troponin reveals a value of less than 3, chest x-ray is interpreted by radiologist reveals no acute cardiopulmonary process.  Patient has a heart score of 2 and I do not believe any further cardiac stratification is warranted at this time.  I encouraged patient to monitor symptoms, if they become worse return emergency room for further evaluation, otherwise follow primary care provider as needed.  Patient was agreeable with this plan discharged home in stable condition.    Ke Tabares  APRN-CNP     Portions of this note were generated using digital voice recognition software, and may contain grammatical errors      Procedure  Procedures     Ke Tabares, APRN-CNP  10/23/24 2027

## 2024-10-23 NOTE — ED TRIAGE NOTES
Pt states that she has been having pain in her right chest area that goes into her neck since 3PM. States that she regularly gets palpitations and she does have GERD as well. States that she is 13 weeks pregnant at this time.

## 2024-10-24 LAB
ATRIAL RATE: 72 BPM
P AXIS: 21 DEGREES
PR INTERVAL: 138 MS
Q ONSET: 254 MS
QRS COUNT: 12 BEATS
QRS DURATION: 96 MS
QT INTERVAL: 421 MS
QTC CALCULATION(BAZETT): 467 MS
QTC FREDERICIA: 451 MS
R AXIS: -4 DEGREES
T AXIS: -14 DEGREES
T OFFSET: 464 MS
VENTRICULAR RATE: 74 BPM

## 2024-12-14 ENCOUNTER — APPOINTMENT (OUTPATIENT)
Dept: RADIOLOGY | Facility: HOSPITAL | Age: 29
End: 2024-12-14
Payer: MEDICAID

## 2024-12-14 ENCOUNTER — HOSPITAL ENCOUNTER (EMERGENCY)
Facility: HOSPITAL | Age: 29
Discharge: HOME | End: 2024-12-14
Payer: MEDICAID

## 2024-12-14 VITALS
SYSTOLIC BLOOD PRESSURE: 117 MMHG | OXYGEN SATURATION: 99 % | DIASTOLIC BLOOD PRESSURE: 69 MMHG | HEIGHT: 67 IN | WEIGHT: 196.6 LBS | RESPIRATION RATE: 16 BRPM | TEMPERATURE: 98.1 F | HEART RATE: 67 BPM | BODY MASS INDEX: 30.86 KG/M2

## 2024-12-14 DIAGNOSIS — Z34.92 SECOND TRIMESTER PREGNANCY (HHS-HCC): Primary | ICD-10-CM

## 2024-12-14 LAB
APPEARANCE UR: CLEAR
BACTERIA #/AREA URNS AUTO: ABNORMAL /HPF
BILIRUB UR STRIP.AUTO-MCNC: NEGATIVE MG/DL
COLOR UR: ABNORMAL
GLUCOSE UR STRIP.AUTO-MCNC: NORMAL MG/DL
KETONES UR STRIP.AUTO-MCNC: NEGATIVE MG/DL
LEUKOCYTE ESTERASE UR QL STRIP.AUTO: ABNORMAL
MUCOUS THREADS #/AREA URNS AUTO: ABNORMAL /LPF
NITRITE UR QL STRIP.AUTO: NEGATIVE
PH UR STRIP.AUTO: 6.5 [PH]
PROT UR STRIP.AUTO-MCNC: NEGATIVE MG/DL
RBC # UR STRIP.AUTO: NEGATIVE /UL
RBC #/AREA URNS AUTO: ABNORMAL /HPF
SP GR UR STRIP.AUTO: 1.01
UROBILINOGEN UR STRIP.AUTO-MCNC: NORMAL MG/DL
WBC #/AREA URNS AUTO: ABNORMAL /HPF

## 2024-12-14 PROCEDURE — 99283 EMERGENCY DEPT VISIT LOW MDM: CPT

## 2024-12-14 PROCEDURE — 81001 URINALYSIS AUTO W/SCOPE: CPT | Performed by: GENERAL PRACTICE

## 2024-12-14 PROCEDURE — 99284 EMERGENCY DEPT VISIT MOD MDM: CPT | Mod: 25

## 2024-12-14 PROCEDURE — 87086 URINE CULTURE/COLONY COUNT: CPT | Mod: PARLAB | Performed by: GENERAL PRACTICE

## 2024-12-14 ASSESSMENT — PAIN - FUNCTIONAL ASSESSMENT: PAIN_FUNCTIONAL_ASSESSMENT: 0-10

## 2024-12-14 ASSESSMENT — PAIN SCALES - GENERAL: PAINLEVEL_OUTOF10: 3

## 2024-12-14 ASSESSMENT — PAIN DESCRIPTION - PAIN TYPE: TYPE: ACUTE PAIN

## 2024-12-14 ASSESSMENT — PAIN DESCRIPTION - LOCATION: LOCATION: ABDOMEN

## 2024-12-14 NOTE — ED TRIAGE NOTES
Patient  20 weeks 6 days presents today reporting RLQ abdominal pain and vaginal discomfort. Did have a renal ultrasound performed on  which demonstrated mild right hydronephrosis. Denies any vaginal bleeding, cramping. Denies any fevers or vomiting. Fetal heart tones performed in triage 136-142bpm

## 2024-12-14 NOTE — DISCHARGE INSTRUCTIONS
Directly to labor and delivery at [facility] as per your OBs request.  Fetal heart tones were 135-143.

## 2024-12-14 NOTE — ED PROVIDER NOTES
Limitations to History: None     HPI:      Tamika Stuart is a 29 y.o.   approximately 20 weeks and 6-day pregnant female presenting to ED today from home [facility] for evaluation of right-sided abdominal pain.  Patient reports she has had right-sided abdominal pain x 2 days, on  seen at [facility].  Told she had a swollen ureter and discharged home to follow-up with OB.   Pain has been persistent, currently rated 4/10.  Positive fetal movement.  No loss of fluid, vaginal bleeding or vaginal discharge.  Denies fever/chills, cough/cold symptoms, chest pain, shortness of breath, nausea/vomiting, dysuria/hematuria, change in bowel habits or any other complaints.  No smoking, EtOH or IV drugs.  OB/GYN Dr. Contreras at [facility].    Additional History Obtained from: Triage/nursing notes reviewed    ------------------------------------------------------------------------------------------------------------------------------------------    VS: As documented in the triage note and EMR flowsheet from this visit were reviewed.    Physical Exam:  Gen: Well-appearing 29-year-old  female, awake and alert, oriented x 3.  Well-nourished and hydrated.  Nontoxic looking.  Head/Neck: NCAT, neck w/ FROM  Eyes: EOMI, PERRL, anicteric sclerae, noninjected conjunctivae  Ears: TMs clear b/l without sign of infection  Nose: Nares patent w/o rhinorrhea  Mouth:  MMM, no OP lesions noted  Heart: RRR no MRG  Lungs: CTA b/l no RRW, no increased work of breathing  Abdomen: Gravid, soft with bowel sounds, mild tenderness on the right lateral abdomen.  No rebound or guarding.  No palpable organomegaly.  No CVA tenderness.  Musculoskeletal: MARAI LUZ x 4.  MSPs intact.  Skin intact.  No deformities.  Neurologic: Alert, symmetrical facies, phonates clearly, moves all extremities equally, responsive to touch, ambulates normally   Skin: Pink, warm and dry.  No erythema, edema or ecchymosis.  No rashes noted  Psychological: calm, no  SI/HI      ------------------------------------------------------------------------------------------------------------------------------------------    Medical Decision Makin y.o.   approximately 20 weeks and 6-day pregnant female at the bedside for right lateral abdominal pain x 2 days.  Seen 2 days ago at [facility] and diagnosed with a swollen right ureter.  Patient presents department today because the symptoms have persisted.  No vaginal bleeding, loss of fluid, contractions, vaginal discharge.  Positive fetal movement.  All signs within normal limits.  Lungs clear, abdomen is gravid and soft with bowel sounds.  Mild tenderness in the right lateral abdomen.  Fetal heart tones 135-143.     Differential includes but is not limited to UTI, worsening hydronephrosis/hydroureter and ANDERS.    Will establish IV, obtain basic labs, UA/urine culture and perform renal ultrasound.    ED Course as of 24 1545   Sat Dec 14, 2024   1541 I was called back into the room, the patient had contacted her OB/GYN earlier in the day but just heard back from her.  The patient's OB/GYN prefers that she go to [facility] for evaluation.  As the patient has normal fetal heart tones and no vaginal bleeding/loss of fluid, I feel comfortable allowing her to transport herself down by private car.  Patient feels comfortable in this.  Shared decision making conducted.  It is noted that the patient has a mild urinary tract infection with white cells, leukocytosis and bacteria.  She was informed of this and provider at [facility] should be able to see her urinalysis on epic.  Repeat vital signs within normal limits.  Patient will go directly to [facility]  Labor and delivery.  Return precautions red flags discussed.  All questions were entertained and answered.  Patient verbalizes understanding of diagnosis and treatment plan.  Condition stable for discharge. [SB]      ED Course User Index  [SB] Sadia Muller, APRN-CNP          Diagnoses as of 12/14/24 1545   Second trimester pregnancy ([facility])       EKG interpreted by myself (ED attending physician): None    Chronic Medical Conditions Significantly Affecting Care: None    External Records Reviewed: I reviewed recent and relevant outside records including: None    Discussion of Management with Other Providers: None    I discussed the patient/results with: None       Sadia Muller, SUE-CNP  12/14/24 1540

## 2024-12-15 LAB — HOLD SPECIMEN: NORMAL

## 2024-12-16 LAB — BACTERIA UR CULT: NO GROWTH
